# Patient Record
Sex: FEMALE | Race: WHITE | Employment: FULL TIME | URBAN - METROPOLITAN AREA
[De-identification: names, ages, dates, MRNs, and addresses within clinical notes are randomized per-mention and may not be internally consistent; named-entity substitution may affect disease eponyms.]

---

## 2018-07-12 ENCOUNTER — OFFICE VISIT (OUTPATIENT)
Dept: URGENT CARE | Age: 38
End: 2018-07-12

## 2018-07-12 VITALS
DIASTOLIC BLOOD PRESSURE: 77 MMHG | WEIGHT: 135 LBS | HEART RATE: 87 BPM | TEMPERATURE: 97.9 F | OXYGEN SATURATION: 100 % | SYSTOLIC BLOOD PRESSURE: 106 MMHG | RESPIRATION RATE: 18 BRPM

## 2018-07-12 DIAGNOSIS — L25.5 RHUS DERMATITIS: Primary | ICD-10-CM

## 2018-07-12 PROCEDURE — G0381 LEV 2 HOSP TYPE B ED VISIT: HCPCS | Performed by: FAMILY MEDICINE

## 2018-07-12 RX ORDER — VENLAFAXINE 25 MG/1
25 TABLET ORAL 2 TIMES DAILY
COMMUNITY

## 2018-07-12 RX ORDER — METHYLPREDNISOLONE 4 MG/1
TABLET ORAL
Qty: 21 TABLET | Refills: 0 | Status: SHIPPED | OUTPATIENT
Start: 2018-07-12

## 2018-07-12 NOTE — PROGRESS NOTES
[facility]        NAME: Demetris Khan is a 40 y o  female  : 1980    MRN: [id number]  DATE: 2018  TIME: 12:04 PM    Assessment and Plan   Rhus dermatitis [L23 7]  1  Rhus dermatitis  Methylprednisolone 4 MG TBPK         Patient Instructions     Patient Instructions   Medrol Dosepak as directed (please take with food)  Hydrocortisone cream 1% to areas twice a day (be careful of eyes)  Please try to avoid further contact with poison  Recheck/follow-up with family physician as needed  Follow up with PCP in 3-5 days  Proceed to  ER if symptoms worsen  Chief Complaint     Chief Complaint   Patient presents with    Rash     FACE X 2 DAYS, WITH USE OF POSION UDAY CREAM          History of Present Illness       Patient with contact dermatitis (Rhus) on face [place]        Review of Systems   Review of Systems   Skin:        Contact dermatitis (Rhus) on face   All other systems reviewed and are negative  Current Medications       Current Outpatient Prescriptions:     venlafaxine (EFFEXOR) 25 mg tablet, Take 25 mg by mouth 2 (two) times a day, Disp: , Rfl:     Methylprednisolone 4 MG TBPK, Use as directed on package, Disp: 21 tablet, Rfl: 0    Current Allergies     Allergies as of 2018    (No Known Allergies)            The following portions of the patient's history were reviewed and updated as appropriate: allergies, current medications, past family history, past medical history, past social history, past surgical history and problem list      History reviewed  No pertinent past medical history  History reviewed  No pertinent surgical history  Family History   Problem Relation Age of Onset    No Known Problems Mother     No Known Problems Father          Medications have been verified          Objective   /77   Pulse 87   Temp 97 9 °F (36 6 °C) (Temporal)   Resp 18   Wt 61 2 kg (135 lb)   LMP 2018   SpO2 100% Physical Exam     Physical Exam   Skin:   Contact dermatitis (Rhus) on face   Nursing note and vitals reviewed

## 2018-07-12 NOTE — PATIENT INSTRUCTIONS
Medrol Dosepak as directed (please take with food)  Hydrocortisone cream 1% to areas twice a day (be careful of eyes)  Please try to avoid further contact with poison  Recheck/follow-up with family physician as needed